# Patient Record
Sex: MALE | Race: WHITE
[De-identification: names, ages, dates, MRNs, and addresses within clinical notes are randomized per-mention and may not be internally consistent; named-entity substitution may affect disease eponyms.]

---

## 2018-01-26 ENCOUNTER — HOSPITAL ENCOUNTER (OUTPATIENT)
Dept: HOSPITAL 68 - ERH | Age: 28
Setting detail: OBSERVATION
LOS: 2 days | End: 2018-01-28
Payer: COMMERCIAL

## 2018-01-26 VITALS — SYSTOLIC BLOOD PRESSURE: 112 MMHG | DIASTOLIC BLOOD PRESSURE: 64 MMHG

## 2018-01-26 VITALS — WEIGHT: 128 LBS | HEIGHT: 65 IN | BODY MASS INDEX: 21.33 KG/M2

## 2018-01-26 DIAGNOSIS — K92.1: Primary | ICD-10-CM

## 2018-01-26 DIAGNOSIS — F79: ICD-10-CM

## 2018-01-26 DIAGNOSIS — Q93.5: ICD-10-CM

## 2018-01-26 DIAGNOSIS — K59.00: ICD-10-CM

## 2018-01-26 LAB
ABSOLUTE GRANULOCYTE CT: 3 /CUMM (ref 1.4–6.5)
APTT BLD: 31 SEC (ref 25–37)
BASOPHILS # BLD: 0 /CUMM (ref 0–0.2)
BASOPHILS NFR BLD: 0.3 % (ref 0–2)
EOSINOPHIL # BLD: 0.1 /CUMM (ref 0–0.7)
EOSINOPHIL NFR BLD: 1.6 % (ref 0–5)
ERYTHROCYTE [DISTWIDTH] IN BLOOD BY AUTOMATED COUNT: 13.6 % (ref 11.5–14.5)
GRANULOCYTES NFR BLD: 52.8 % (ref 42.2–75.2)
HCT VFR BLD CALC: 44.7 % (ref 42–52)
LYMPHOCYTES # BLD: 1.9 /CUMM (ref 1.2–3.4)
MCH RBC QN AUTO: 29.5 PG (ref 27–31)
MCHC RBC AUTO-ENTMCNC: 32.7 G/DL (ref 33–37)
MCV RBC AUTO: 90.2 FL (ref 80–94)
MONOCYTES # BLD: 0.6 /CUMM (ref 0.1–0.6)
PLATELET # BLD: 307 /CUMM (ref 130–400)
PMV BLD AUTO: 8.5 FL (ref 7.4–10.4)
PROTHROMBIN TIME: 11.2 SEC (ref 9.4–12.5)
RED BLOOD CELL CT: 4.95 /CUMM (ref 4.7–6.1)
WBC # BLD AUTO: 5.6 /CUMM (ref 4.8–10.8)

## 2018-01-26 PROCEDURE — G0378 HOSPITAL OBSERVATION PER HR: HCPCS

## 2018-01-26 NOTE — PN- ATT ADDEND
Attending Addendum
Attending Brief Note
27 Year old male lives at a group home, recently in the hospital for "intestinal
obstruction" treatedmedically resolved. A little constipated , had a hard bowel 
movement followed by blood clots, brought to the ER in stable condition stable H
/H. GI was consulted, would like to watch him follow blood work.
 Laboratory Tests
 
 
 
01/26/18 1500:
Anion Gap 14, Estimated GFR > 60, BUN/Creatinine Ratio 16.3, Glucose 88, Calcium
9.8, Total Bilirubin 0.4, AST 25, ALT 69, Alkaline Phosphatase 56, Total Protein
6.8, Albumin 4.3, Globulin 2.5, Albumin/Globulin Ratio 1.7, PT 11.2, INR 1.07, 
APTT 31, CBC w Diff NO MAN DIFF REQ, RBC 4.95, MCV 90.2, MCH 29.5, MCHC 32.7  L,
RDW 13.6, MPV 8.5, Gran % 52.8, Lymphocytes % 34.4, Monocytes % 10.9  H, 
Eosinophils % 1.6, Basophils % 0.3, Absolute Granulocytes 3.0, Absolute 
Lymphocytes 1.9, Absolute Monocytes 0.6, Absolute Eosinophils 0.1, Absolute 
Basophils 0
Vital Signs
 
 
Date Time Temp Pulse Resp B/P B/P Pulse O2 O2 Flow FiO2
 
     Mean Ox Delivery Rate 
 
01/26 1610 97.1 86 20 103/74  97 Room Air  
 
01/26 1607  86  103/74     
 
01/26 1442      96 Room Air  
 
01/26 1434 98.7 86 20 120/57  96 Room Air

## 2018-01-26 NOTE — CONS- GASTROENTEROLOGY
General Information and HPI
 
Consulting Request
Date of Consult: 01/26/18
Requested By:
MD Scott Carlos
 
Reason for Consult:
1.  Hematochezia 
2.  Constipation
Source of Information: Report from Group Home
Exam Limitations: unable to give history
History of Present Illness:
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
in [facility] proximally 2 weeks ago with a small bowel obstruction 
which was treated conservatively.  The patient was brought to the ED from the 
group home where he resides.  They report that he had a diaper full of darkish 
red blood with clots.  Per their report he had had a large hard stool prior to 
this.  He had not complained of any nausea vomiting or abdominal pain.  Patient 
is nonverbal.  History is obtained from HENRY Tamez in the ED as well as from
a representative of the group home.  Patient had a CT scan of the abdomen and 
pelvis which was unrevealing.
 
Allergies/Medications
Allergies:
Coded Allergies:
No Known Allergies (01/26/18)
 
Home Med List:
Acetaminophen 325 MG TABLET   2 TAB PO Q4H PRN PAIN/FEVER>100.2  (Reported)
Bacitracin 500 UNIT/GRAM OINT...G.   1 DANYELL TOP BID PRN ABRASIONS/SCRAPES/MINOR 
BURNS  (Reported)
     apply to affected area(s)
Chlorhexidine Gluconate (Periogard) (Unknown Strength) MOUTHWASH   (Unknown Dose
) PO BID ORAL HEALTH  (Reported)
Cholecalciferol (Vitamin D3) (Vitamin D) 1,000 UNIT TABLET   1 TAB PO DAILY 
SUPPLEMENT  (Reported)
Guaifenesin/Dextromethorphan (Zyncof  MG/5 Ml Liquid) 400 MG-20 MG/5 ML 
LIQUID   5 ML PO Q4H PRN COUGH/CONGESTION  (Reported)
Ketoconazole (Nizoral) 2 % SHAMPOO   1 DANYELL TOP MONDAY WED FRIDAY SCALP  (
Reported)
Magnesium Hydroxide (Milk Of Magnesia) 400 MG/5 ML ORAL.SUSP   15 ML PO QPM PRN 
CONSTIPATION  (Reported)
Mineral Oil/Hydrophil Petrolat (Aquaphor Ointment) 396 GM OINT...G.   1 DANYELL TOP 
AD PRN SKIN  (Reported)
Petrolatum,White (Balmex) 51.1 % OINT...G.   1 DANYELL TOP AD PRN PERINEAL AREA/RED,
CHAFFED SKI  (Reported)
Polyethylene Glycol 3350 (Miralax) 17 GRAM POWD.PACK   1 PAC PO DAILY GI  (
Reported)
     dissolve in water
Pyrithione Zinc (Selsun Blue) (Unknown Strength) SHAMPOO   (Unknown Dose) TOP AD
PRN DANDRUFF  (Reported)
 
Current Medications:
 Current Medications
 
 
  Sig/Cristian Start time  Last
 
Medication Dose Route Stop Time Status Admin
 
Diphenhydramine HCl 50 MG ONCE ONE 01/26 1700 DC 01/26
 
  IV 01/26 1701 1709
 
Diphenhydramine HCl 0 .STK-MED ONE 01/26 1700 DC 
 
  .ROUTE   
 
Lorazepam 1 MG ONCE ONE 01/26 1700 DC 01/26
 
  IV 01/26 1701 1709
 
Lorazepam 0 .STK-MED ONE 01/26 1700 DC 
 
  .ROUTE   
 
 
 
 
Past History
 
Travel History
Traveled to [place] past 21 day No
 
Medical History
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
Gastrointestinal: S/P BOWEL OBSTRUCTION
Musculoskeletal: BILATER LEG BRACES
 
Surgical History
Surgical History: none
 
Psychosocial History
ETOH Use: denies use
Illicit Drug Use: denies illicit drug use
 
Review of Systems
Review of Systems:
Unable to obtain review of systems
 
Exam & Diagnostic Data
Vital Signs and I&O
Vital Signs
 
 
Date Time Temp Pulse Resp B/P B/P Pulse O2 O2 Flow FiO2
 
     Mean Ox Delivery Rate 
 
01/26 1610 97.1 86 20 103/74  97 Room Air  
 
01/26 1607  86  103/74     
 
01/26 1442      96 Room Air  
 
01/26 1434 98.7 86 20 120/57  96 Room Air  
 
 
 Intake & Output
 
 
 01/26 1600 01/26 0400 01/25 1600 01/25 0400 01/24 1600 01/24 0400
 
Intake Total 0     
 
Output Total      
 
Balance 0     
 
       
 
Intake, Oral 0     
 
 
 
 
Physical Exam
General Appearance: no apparent distress, alert, awake
Head: normal appearance
Eyes:
Bilateral: normal appearance. 
Respiratory: chest non-tender, lungs clear
Cardiovascular: regular rate/rhythm, Normal S1 and S2
Gastrointestinal: soft, non-tender, no organomegaly
Neurologic/Psych: Unable to fully examine.  Patient does not follow commands.  
Moves all extremities
Skin: intact, normal color, warm/dry
 
Results
Pertinent Lab Results:
 Laboratory Tests
 
 
 01/26
 
 1500
 
Chemistry 
 
  Sodium (137 - 145 mmol/L) 141
 
  Potassium (3.5 - 5.1 mmol/L) 4.1
 
  Chloride (98 - 107 mmol/L) 97  L
 
  Carbon Dioxide (22 - 30 mmol/L) 30
 
  Anion Gap (5 - 16) 14
 
  BUN (9 - 20 mg/dL) 13
 
  Creatinine (0.7 - 1.2 mg/dL) 0.8
 
  Estimated GFR (>60 ml/min) > 60
 
  BUN/Creatinine Ratio (7 - 25 %) 16.3
 
  Glucose (65 - 99 mg/dL) 88
 
  Calcium (8.4 - 10.2 mg/dL) 9.8
 
  Total Bilirubin (0.2 - 1.3 mg/dL) 0.4
 
  AST (17 - 59 U/L) 25
 
  ALT (21 - 72 U/L) 69
 
  Alkaline Phosphatase (< 127 U/L) 56
 
  Total Protein (6.3 - 8.2 g/dL) 6.8
 
  Albumin (3.5 - 5.0 g/dL) 4.3
 
  Globulin (1.9 - 4.2 gm/dL) 2.5
 
  Albumin/Globulin Ratio (1.1 - 2.2 %) 1.7
 
Coagulation 
 
  PT (9.4 - 12.5 SEC) 11.2
 
  INR (0.90 - 1.17) 1.07
 
  APTT (25 - 37 SEC) 31
 
Hematology 
 
  CBC w Diff NO MAN DIFF REQ
 
  WBC (4.8 - 10.8 /CUMM) 5.6
 
  RBC (4.70 - 6.10 /CUMM) 4.95
 
  Hgb (14.0 - 18.0 G/DL) 14.6
 
  Hct (42 - 52 %) 44.7
 
  MCV (80.0 - 94.0 FL) 90.2
 
  MCH (27.0 - 31.0 PG) 29.5
 
  MCHC (33.0 - 37.0 G/DL) 32.7  L
 
  RDW (11.5 - 14.5 %) 13.6
 
  Plt Count (130 - 400 /CUMM) 307
 
  MPV (7.4 - 10.4 FL) 8.5
 
  Gran % (42.2 - 75.2 %) 52.8
 
  Lymphocytes % (20.5 - 51.1 %) 34.4
 
  Monocytes % (1.7 - 9.3 %) 10.9  H
 
  Eosinophils % (0 - 5 %) 1.6
 
  Basophils % (0.0 - 2.0 %) 0.3
 
  Absolute Granulocytes (1.4 - 6.5 /CUMM) 3.0
 
  Absolute Lymphocytes (1.2 - 3.4 /CUMM) 1.9
 
  Absolute Monocytes (0.10 - 0.60 /CUMM) 0.6
 
  Absolute Eosinophils (0.0 - 0.7 /CUMM) 0.1
 
  Absolute Basophils (0.0 - 0.2 /CUMM) 0
 
 
 
Imaging/Other Studies:
 
FINDINGS:
There is mild dependent bibasilar atelectasis. The visualized lung bases are
otherwise clear. The visualized portions of the heart are unremarkable.
 
The liver is of normal size and attenuation without focal lesions nor
intrahepatic biliary ductal dilation. A normal gallbladder is identified.
There is no wall thickening or discernible pericholecystic fluid.
 
The spleen, pancreas, adrenal glands are unremarkable.
 
Both kidneys are of normal size and attenuation without hydronephrosis or
nephrolithiasis. Following the administration of IV contrast, prompt
symmetric nephrograms are displayed.
 
There is no abdominal free fluid. There is neither mesenteric nor
retroperitoneal lymphadenopathy.
 
Normal unopacified loops of small and large bowel are identified. A normal
appendix is identified. There is no pelvic free fluid. The urinary bladder is
unremarkable. There is neither pelvic nor inguinal lymphadenopathy.
 
Bone windows: Neither sclerotic nor lytic bone lesions are identified.
 
IMPRESSION:
 
No evidence for acute abdominal or pelvic inflammatory or infectious
processes.
 
Assessment/Plan
Assessment/Recommendations:
ASSESSMENT:
1.  Chronic Constipation
2.  Hematochezia
Patient's hematochezia is likely related to either acute nonobstructive colonic 
ischemia in the setting of acute severe constipation or possibly stercoral 
ulceration.  Patient is currently comfortable and not complaining of any 
abdominal pain.  Patient has not had any further hematochezia in the ED.  Per 
the representative from the group home patient has been started on MiraLAX to 
address the issue of chronic constipation
 
RECOMMENDATIONS:
1.  Patient admitted for observation
2.  Check H&H in a.m.
3.  Observe for clinical signs of gastrointestinal bleeding
4.  Patient have colonoscopy as an outpatient.
5.  Patient to follow-up with Dr. Diaz for outpatient colonoscopy.  This can
be scheduled as an open access colonoscopy
 
 
Consult Acknowledgment
- Thank you for your consult request.

## 2018-01-26 NOTE — HISTORY & PHYSICAL
Nelson HAMPTON,[facility] 01/26/18 1825:
General Information and HPI
MD Statement:
I have seen and personally examined CHILO ACUÑA and documented this H&P.
 
The patient is a 27 year old M who presented with a patient stated chief 
complaint of [GI bleed].
 
Source of Information: care taker
Exam Limitations: not alert/orientated, clinical condition
History of Present Illness:
27-year-old male with a past medical history of Angelman syndrome and bowel 
obstruction and not verbal at baseline who presented with GI bleed.  The patient
's caretaker states that the patient had a large stool this morning.  The 
patient then went to the bathroom again this afternoon.  The previous caretaker 
stated that there was a lot of blood clots mixed with stool in addition to 
bright red blood.  Prior to having it bowel movement this morning, the patient's
last was 2 days prior.  The caretaker states that the patient never had episodes
like this before.  The caretaker states that the patient has not had a nausea 
vomiting.
 
Allergies/Medications
Allergies:
Coded Allergies:
No Known Allergies (01/26/18)
 
Home Med list
Acetaminophen 325 MG TABLET   2 TAB PO Q4H PRN PAIN/FEVER>100.2  (Reported)
Bacitracin 500 UNIT/GRAM OINT...G.   1 DANYELL TOP BID PRN ABRASIONS/SCRAPES/MINOR 
BURNS  (Reported)
     apply to affected area(s)
Chlorhexidine Gluconate (Periogard) (Unknown Strength) MOUTHWASH   (Unknown Dose
) PO BID ORAL HEALTH  (Reported)
Cholecalciferol (Vitamin D3) (Vitamin D) 1,000 UNIT TABLET   1 TAB PO DAILY 
SUPPLEMENT  (Reported)
Guaifenesin/Dextromethorphan (Zyncof  MG/5 Ml Liquid) 400 MG-20 MG/5 ML 
LIQUID   5 ML PO Q4H PRN COUGH/CONGESTION  (Reported)
Ketoconazole (Nizoral) 2 % SHAMPOO   1 DANYELL TOP MONDAY WED FRIDAY SCALP  (
Reported)
Magnesium Hydroxide (Milk Of Magnesia) 400 MG/5 ML ORAL.SUSP   15 ML PO QPM PRN 
CONSTIPATION  (Reported)
Mineral Oil/Hydrophil Petrolat (Aquaphor Ointment) 396 GM OINT...G.   1 DANYELL TOP 
AD PRN SKIN  (Reported)
Petrolatum,White (Balmex) 51.1 % OINT...G.   1 DANYELL TOP AD PRN PERINEAL AREA/RED,
CHAFFED SKI  (Reported)
Polyethylene Glycol 3350 (Miralax) 17 GRAM POWD.PACK   1 PAC PO DAILY GI  (
Reported)
     dissolve in water
Pyrithione Zinc (Selsun Blue) (Unknown Strength) SHAMPOO   (Unknown Dose) TOP AD
PRN DANDRUFF  (Reported)
 
 
Past History
 
Travel History
Traveled to [place] past 21 day No
 
Medical History
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
Gastrointestinal: S/P BOWEL OBSTRUCTION
Musculoskeletal: BILATER LEG BRACES
 
Past Family/Social History
 
Psychosocial History
ETOH Use: denies use
Illicit Drug Use: denies illicit drug use
 
Review of Systems
 
Review of Systems
Constitutional:
Reports: no symptoms. 
Cardiovascular:
Reports: no symptoms. 
Respiratory:
Reports: no symptoms. 
GI:
Reports: bloody stool. 
Genitourinary:
Reports: no symptoms. 
Musculoskeletal:
Reports: no symptoms. 
Skin:
Reports: no symptoms. 
 
Exam & Diagnostic Data
Last 24 Hrs of Vital Signs/I&O
 Vital Signs
 
 
Date Time Temp Pulse Resp B/P B/P Pulse O2 O2 Flow FiO2
 
     Mean Ox Delivery Rate 
 
01/26 2300  58 18 112/64  100 Room Air  
 
01/26 1904 97.6 75 18 103/75     
 
01/26 1610 97.1 86 20 103/74  97 Room Air  
 
01/26 1607  86  103/74     
 
01/26 1442      96 Room Air  
 
01/26 1434 98.7 86 20 120/57  96 Room Air  
 
 
 Intake & Output
 
 
 01/27 0800 01/27 0000 01/26 1600
 
Intake Total  120 0
 
Output Total   
 
Balance  120 0
 
    
 
Intake, Oral  120 0
 
Patient  128 lb 
 
Weight   
 
Weight   
 
Measurement   
 
Method   
 
 
 
 
Physical Exam
General Appearance the patient's eyes are closed, and the patient does not 
communicate with staff
Skin finger capillary refill less than 2 seconds
Cardiovascular Regular Rate, Normal S1, Normal S2
Lungs Clear to Auscultation, Normal Air Movement
Abdomen Normal Bowel Sounds, Soft, No Tenderness
Vascular 2+ radial pulses
Last 24 Hrs of Labs/Albaro:
 Laboratory Tests
 
01/26/18 1500:
Anion Gap 14, Estimated GFR > 60, BUN/Creatinine Ratio 16.3, Glucose 88, Calcium
9.8, Total Bilirubin 0.4, AST 25, ALT 69, Alkaline Phosphatase 56, Total Protein
6.8, Albumin 4.3, Globulin 2.5, Albumin/Globulin Ratio 1.7, PT 11.2, INR 1.07, 
APTT 31, CBC w Diff NO MAN DIFF REQ, RBC 4.95, MCV 90.2, MCH 29.5, MCHC 32.7  L,
RDW 13.6, MPV 8.5, Gran % 52.8, Lymphocytes % 34.4, Monocytes % 10.9  H, 
Eosinophils % 1.6, Basophils % 0.3, Absolute Granulocytes 3.0, Absolute 
Lymphocytes 1.9, Absolute Monocytes 0.6, Absolute Eosinophils 0.1, Absolute 
Basophils 0
 
 
Assessment/Plan
Assessment:
A: 27-year-old male with a past medical history of Angelman syndrome and bowel 
obstruction and not verbal at baseline who presented with GI bleed.
 
P: 
#GI bleed
H/H 14.6/44.7
INR 1.07, PTT 31, PT 11.2
 
-Monitor vitals closely every shift, Type and screen, 2 large IV bore needles 
gentle hydration
-monitoring of hemoglobin and hematocrit
-IV Protonix
-reccheck orthostats
-Guaiac stools
-Patient was seen by Dr. Diaz who recommended that the patient have follow-
up for outpatient colonoscopy
 
#Chronic constipation
-Vitamin D, MiraLAX, milk of magnesia
 
#Angelman syndrome
-Continuous observation monitor
 
#full code
 
DVT prophylaxis subcutaneous Lovenox
 
As Ranked By This Provider
Problem List:
 1. GI bleed
   Qualifiers
 GI bleed type/associated pathology: anorectal hemorrhage Qualified Code: K62.5 
- Hemorrhage of anus and rectum
 
 
Core Measures/Misc (9/17)
 
Acute Coronary Syndrome
ACS Diagnosis: No
 
Congestive Heart Failure
Congestive Heart Failure Diagnosis No
 
Cerebrovascular Accident
CVA/TIA Diagnosis: No
 
VTE (View Protocol)
VTE Risk Factors Acute Medical Illness
No Mechanical VTE Prophylaxis d/t Other
No VTE Pharm Prophylaxis d/t NA PharmProphylax ordered
 
Sepsis (View protocol)
Sepsis Present: No
 
Aleksander House 01/27/18 0045:
Past History
 
Surgical History
Surgical History: unobtainable
 
 
Resident Review Statement
Resident Statement: examined this patient, discussed with intern, agreed with 
intern, discussed with family, reviewed EMR data (avail), discussed with nursing
, discussed with case mgmt, reviewed images, amended to note
Other Findings:
This is a 27-year-old male with past medical history significant for mental 
retardation, Angelman syndrome, bowel obstruction, constipation presented to the
[place] from his group home for evaluation of bright red blood per rectum.
 
She was admitted to [facility] 2 weeks ago for nausea, vomiting, found 
to have small bowel obstruction, hospitalized for 5 days, managed 
conservatively.
 
The patient was brought to the ED from the group home where he resides.  They 
report that he had a diaper full of darkish red blood with clots.  Per their 
report he had a large hard stool prior to this.  He had not complained of any 
nausea vomiting or abdominal pain.  Patient is nonverbal.  Review of systems 
unobtainable.
 
 
--------------------------------------------------------------------------------
-----
Vitals at the time of presentation afebrile, heart rate 80, respiratory rate 20,
blood pressure 120/57, saturating at 96 on room air.
Physical exam [id number] normal, breath sounds good, abdomen nondistended 
soft, nontender.
Labs at the time of admission 
hemoglobin 14, hematocrit 44, CBC, BMP completely normal.  LFTs normal.
CAT scan of abdomen and pelvis was done which was unremarkable
 
Assessment and plan
1. Hematochezia
2. Chronic constipation
3: Mental retardation, nonverbal at baseline
 
 
1.  Hematochezia
Patient presented with an episode of bright red blood per rectum with clots.  He
has been constipated for last few days.  Bright red blood was seen after having 
large bowel movements.  He is hemodynamically stable blood pressure within 
normal limits.  Not tachycardic.  Hemoglobin 14 and hematocrit 44.7.  Lower GI 
bleed hematochezia most likely from acute severe constipation/sterc oral 
ulceration.  Patient denies any further episodes of hematochezia in the 
emergency room.
 
* Place under observation status in the general medicine floor
* Monitor vitals closely every shift
* Type and screen
* 2 large IV bore needles gentle hydration
* Serial monitoring of hemoglobin and hematocrit
* Closely monitor for any signs of GI bleed
* IV Protonix
* Management of constipation with bowel regimen
* Appreciate Gastro recommendation
* We'll check orthostats
* Guaiac stools
* Patient will need outpatient colonoscopy.
 
 
Chronic constipation
Bowel regimen was added
 
Mental retardation
Continuous observation monitor
 
Patient is full code
DVT prophylaxis subcutaneous Lovenox
Regular diet

## 2018-01-26 NOTE — ED GI/GU/ABDOMINAL COMPLAINT
History of Present Illness
 
General
Chief Complaint: General Adult
Stated Complaint: BIBA FOR RECTAL BLEED
Source: [facility]
Exam Limitations: clinical condition
 
Vital Signs & Intake/Output
Vital Signs & Intake/Output
 Vital Signs
 
 
Date Time Temp Pulse Resp B/P B/P Pulse O2 O2 Flow FiO2
 
     Mean Ox Delivery Rate 
 
 1610 97.1 86 20 103/74  97 Room Air  
 
 1607  86  103/74     
 
 1442      96 Room Air  
 
 1434 98.7 86 20 120/57  96 Room Air  
 
 
 
Allergies
Coded Allergies:
No Known Allergies (18)
 
Triage Note:
28 Y/O MALE TO ED ROOM 17 VIA EMS STRETCHER FROM
ADULT  WITH C/O LOWER GI BLEED. DIAPER AT
 HAD STOOL WITH BLOOD AND CLOTS.
Triage Nurses Notes Reviewed? yes
Onset: Abrupt
Duration: day(s): (2), changing over time, continues in ED
Timing: single episode today
Activities at Onset: BOWEL MOVEMENT
Prior Abdominal Problems: none
Past Sexual History: Unobtainable at this time
No Modifying Factors: none
Associated Symptoms: HEMATOCHEZIA
HPI:
27-year-old male past medical history of Angelman syndrome since her evaluation 
of rectal bleeding.  Patient's caregiver reports that patient was hospitalized 
at [facility] 2 weeks ago for a small bowel obstruction.  He was 
discharged after several days without a surgical procedure.  The small bowel 
obstruction resolved on its own.  Patient had been constipated for several days 
after this.  He was treated with milk of magnesia yesterday he had a large hard 
bowel movement without any blood.  Today he had a bowel movement that was 
described by his caregiver as "appeared like a woman's menstrual cycle".  There 
was blood and dark red blood clots.  Patient does not take blood thinners.  His 
mental status is at baseline.  He's been eating and drinking normally.  No 
abdominal pain nausea or vomiting.  He has not had a colonoscopy in the past.  
There is been no additional bleeding other than the single episode.
(Scottie Farrell)
Reconcile Medications
Acetaminophen 325 MG TABLET   2 TAB PO Q4H PRN PAIN/FEVER>100.2  (Reported)
Bacitracin 500 UNIT/GRAM OINT...G.   1 DANYELL TOP BID PRN ABRASIONS/SCRAPES/MINOR 
BURNS  (Reported)
     apply to affected area(s)
Chlorhexidine Gluconate (Periogard) (Unknown Strength) MOUTHWASH   (Unknown Dose
) PO BID ORAL HEALTH  (Reported)
Cholecalciferol (Vitamin D3) (Vitamin D) 1,000 UNIT TABLET   1 TAB PO DAILY 
SUPPLEMENT  (Reported)
Guaifenesin/Dextromethorphan (Zyncof  MG/5 Ml Liquid) 400 MG-20 MG/5 ML 
LIQUID   5 ML PO Q4H PRN COUGH/CONGESTION  (Reported)
Ketoconazole (Nizoral) 2 % SHAMPOO   1 DANYELL TOP  SCALP  (
Reported)
Magnesium Hydroxide (Milk Of Magnesia) 400 MG/5 ML ORAL.SUSP   15 ML PO QPM PRN 
CONSTIPATION  (Reported)
Mineral Oil/Hydrophil Petrolat (Aquaphor Ointment) 396 GM OINT...G.   1 DANYELL TOP 
AD PRN SKIN  (Reported)
Omeprazole 20 MG TABLET.DR   1 TAB PO DAILY gi bleed
Petrolatum,White (Balmex) 51.1 % OINT...G.   1 DANYELL TOP AD PRN PERINEAL AREA/RED,
CHAFFED SKI  (Reported)
Polyethylene Glycol 3350 (Miralax) 17 GRAM POWD.PACK   1 PAC PO DAILY GI  (
Reported)
     dissolve in water
Pyrithione Zinc (Selsun Blue) (Unknown Strength) SHAMPOO   (Unknown Dose) TOP AD
PRN DANDRUFF  (Reported)
 
(Rosa HAMPTON,Drew MILLER)
 
Past History
 
Travel History
Traveled to [place] past 21 day No
 
Medical History
Any Pertinent Medical History? see below for history
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
Gastrointestinal: S/P BOWEL OBSTRUCTION
Musculoskeletal: BILATER LEG BRACES
 
Surgical History
Surgical History: none
 
Psychosocial History
Tobacco Use: Never used
ETOH Use: denies use
Illicit Drug Use: denies illicit drug use
 
Family History
Hx Contributory? No
(Scottie Farrell)
 
Review of Systems
 
Review of Systems
Constitutional:
Reports: no symptoms. 
EENTM:
Reports: no symptoms. 
Respiratory:
Reports: no symptoms. 
Cardiovascular:
Reports: no symptoms. 
GI:
Reports: bloody stool. 
Genitourinary:
Reports: no symptoms. 
Musculoskeletal:
Reports: no symptoms. 
Skin:
Reports: no symptoms. 
Neurological/Psychological:
Reports: no symptoms. 
Hematologic/Endocrine:
Reports: no symptoms. 
Immunologic/Allergic:
Reports: no symptoms. 
All Other Systems: Reviewed and Negative
(Scottie Farrell)
 
Physical Exam
 
Physical Exam
General Appearance: well developed/nourished, no apparent distress, alert, awake
, comfortable
Head: atraumatic, normal appearance
Eyes:
Bilateral: normal appearance, PERRL, EOMI. 
Ears, Nose, Throat, Mouth: hearing grossly normal, moist mucous membrane, 
Tympanic normal
Neck: normal inspection, supple, full range of motion
Respiratory: normal breath sounds, chest non-tender, no respiratory distress, 
lungs clear
Cardiovascular: regular rate/rhythm, normal peripheral pulses
Peripheral Pulses:
2+ radial (R), 2+ radial (L)
Gastrointestinal: normal bowel sounds, soft, non-tender, no organomegaly
Rectal: normal inspection, normal rectal tone, heme positive stool
Back: normal inspection, normal range of motion
Extremities: normal range of motion
Neurologic/Psych: no motor/sensory deficits, awake, alert
Skin: intact, normal color, warm/dry
 
Core Measures
ACS in differential dx? No
Sepsis Present: No
Sepsis Focused Exam Completed? No
(Scottie Farrell)
 
Progress
Differential Diagnosis: appendicitis, biliary colic, bowel obstruction, 
diverticulitis, gastritis, hemorrhoids, Naomy-Blessing tear, peptic ulcer, PUD/
GERD, SBO, avm, ISCHEMIC COLITIS
Plan of Care:
 Orders
 
 
Procedure Date/time Status
 
Patient Data  1821 Active
 
Place in observation  1625 Active
 
MISTAKE  1442 Active
 
PARTIAL THROMBOPLASTIN TIME  1442 Complete
 
PROTHROMBIN TIME  1442 Complete
 
COMPREHENSIVE METABOLIC PANEL  1442 Complete
 
CBC WITHOUT DIFFERENTIAL  1442 Complete
 
Intake & Output  1433 Active
 
 
 Laboratory Tests
 
 
 
18 1500:
Anion Gap 14, Estimated GFR > 60, BUN/Creatinine Ratio 16.3, Glucose 88, Calcium
9.8, Total Bilirubin 0.4, AST 25, ALT 69, Alkaline Phosphatase 56, Total Protein
6.8, Albumin 4.3, Globulin 2.5, Albumin/Globulin Ratio 1.7, PT 11.2, INR 1.07, 
APTT 31, CBC w Diff NO MAN DIFF REQ, RBC 4.95, MCV 90.2, MCH 29.5, MCHC 32.7  L,
RDW 13.6, MPV 8.5, Gran % 52.8, Lymphocytes % 34.4, Monocytes % 10.9  H, 
Eosinophils % 1.6, Basophils % 0.3, Absolute Granulocytes 3.0, Absolute 
Lymphocytes 1.9, Absolute Monocytes 0.6, Absolute Eosinophils 0.1, Absolute 
Basophils 0
 
 
Patient seen and evaluated.  He has what was described as large amount of dark 
blood and blood clots in his stool.  This is concerning for a larger GI bleed.  
He also had a possible small bowel obstruction 2 weeks ago and had a large hard 
bowel movement that proceeded the blood.  Patient's mental status is at baseline
he is hemodynamically stable.  Rectal exam was positive for occult blood.  He is
clinically well appearing.  Bloodwork is within normal limits.  CT scan does not
show any evidence of colitis.  Spoke with Dr. Le from gastroenterology.  
She recommends admitting the patient for observation due to the concern for 
significant GI bleeding.  Patient will be kept for serial H&H's, colonoscopy, 
monitoring of vital signs and GI consult, case discussed with Dr. Lee he 
agrees.
Diagnostic Imaging:
Viewed by Me: CT Scan.  Discussed w/RAD: CT Scan. 
Radiology Impression: PATIENT: CHILO ACUÑA  MEDICAL RECORD NO: [id number] PRESENT 
AGE: 27  PATIENT ACCOUNT NO: [id number] : 91  LOCATION: [facility] ORDERING 
PHYSICIAN: Scottie FIGUEROA     SERVICE DATE:  EXAM TYPE: CAT - CT ABD
& PELVIS W IV CONTRAST EXAMINATION: CT ABDOMEN AND PELVIS WITH CONTRAST CLINICAL
INFORMATION: Abdominal pain. COMPARISON: None. TECHNIQUE: Contiguous axial thin 
section helical images of the abdomen and pelvis were performed following the 
administration of 95 mL of intravenous Optiray 320. The data set was reformatted
in the coronal and sagittal planes and reviewed on an independent workstation. 
DLP: 348 mGy-cm. FINDINGS: There is mild dependent bibasilar atelectasis. The 
visualized lung bases are otherwise clear. The visualized portions of the heart 
are unremarkable. The liver is of normal size and attenuation without focal 
lesions nor intrahepatic biliary ductal dilation. A normal gallbladder is 
identified. There is no wall thickening or discernible pericholecystic fluid. 
The spleen, pancreas, adrenal glands are unremarkable. Both kidneys are of 
normal size and attenuation without hydronephrosis or nephrolithiasis. Following
the administration of IV contrast, prompt symmetric nephrograms are displayed. 
There is no abdominal free fluid. There is neither mesenteric nor 
retroperitoneal lymphadenopathy. Normal unopacified loops of small and large 
bowel are identified. A normal appendix is identified. There is no pelvic free 
fluid. The urinary bladder is unremarkable. There is neither pelvic nor inguinal
lymphadenopathy. Bone windows: Neither sclerotic nor lytic bone lesions are 
identified. IMPRESSION: No evidence for acute abdominal or pelvic inflammatory 
or infectious processes. DICTATED BY: Glenn Peres MD  DATE/TIME DICTATED: :RAD.[email or web address]  DATE/TIME TRANSCRIBED:18 
CONFIDENTIAL, DO NOT COPY WITHOUT APPROPRIATE AUTHORIZATION.
Initial ED EKG: none
(Scottie Farrell)
 
Departure
 
Departure
Disposition: STILL A PATIENT
Condition: Stable
Clinical Impression
Primary Impression: GI bleed
Qualifiers:  GI bleed type/associated pathology: anorectal hemorrhage Qualified 
Code: K62.5 - Hemorrhage of anus and rectum
Referrals:
Ferdinand Scott MD (PCP/Family)
 
Departure Forms:
Customer Survey
General Discharge Information
 
Observation Note
Spoke With:
Ferdinand Scott MD
Physician Advisor Notified: SHEKHAR HAMPTON,HOLDEN JOSE
Place Patient In: Non-ED OBS Care Area
Rationale for Observation:
My rational for observation is as follows [patient had a large amount of 
bleeding from his rectum that is described as "a woman's menstrual cycle" by his
caretakers.  There were large numbers of clots there.  This is concerning for a 
significant GI bleed.  Dr. Le from gastroenterology recommends admitting 
for observation for a GI consult, colonoscopy, serial labs, monitoring of vital 
signs].
 
(Scottie Farrell)
 
Departure
Prescriptions:
Current Visit Scripts
Omeprazole 1 TAB PO DAILY  
     #30 TAB 
 
 
 
PA/NP Co-Sign Statement
Statement:
ED Attending supervision documentation-
 
[X] I saw and evaluated the patient. I have also reviewed all the pertinent lab 
results and diagnostic results. I agree with the findings and the plan of care 
as documented in the PA's/NP's documentation.  Patient presents for possibility 
of a GI bleed.  Physical examination reveals a comfortable appearing patient 
with nondistended nontender abdomen.
 
[] I have reviewed the ED Record and agree with the PA's/NP's documentation.
 
[] Additions or exceptions (if any) to the PAs/NP's note and plan are 
summarized below:
[]
 
(Rosa HAMPTON,Drew MILLER)

## 2018-01-26 NOTE — CT SCAN REPORT
EXAMINATION:
CT ABDOMEN AND PELVIS WITH CONTRAST
 
CLINICAL INFORMATION:
Abdominal pain.
 
COMPARISON:
None.
 
TECHNIQUE:
Contiguous axial thin section helical images of the abdomen and pelvis were
performed following the administration of 95 mL of intravenous Optiray 320.
The data set was reformatted in the coronal and sagittal planes and reviewed
on an independent workstation.
 
DLP: 348 mGy-cm.
 
FINDINGS:
There is mild dependent bibasilar atelectasis. The visualized lung bases are
otherwise clear. The visualized portions of the heart are unremarkable.
 
The liver is of normal size and attenuation without focal lesions nor
intrahepatic biliary ductal dilation. A normal gallbladder is identified.
There is no wall thickening or discernible pericholecystic fluid.
 
The spleen, pancreas, adrenal glands are unremarkable.
 
Both kidneys are of normal size and attenuation without hydronephrosis or
nephrolithiasis. Following the administration of IV contrast, prompt
symmetric nephrograms are displayed.
 
There is no abdominal free fluid. There is neither mesenteric nor
retroperitoneal lymphadenopathy.
 
Normal unopacified loops of small and large bowel are identified. A normal
appendix is identified. There is no pelvic free fluid. The urinary bladder is
unremarkable. There is neither pelvic nor inguinal lymphadenopathy.
 
Bone windows: Neither sclerotic nor lytic bone lesions are identified.
 
IMPRESSION:
 
No evidence for acute abdominal or pelvic inflammatory or infectious
processes.

## 2018-01-27 VITALS — DIASTOLIC BLOOD PRESSURE: 60 MMHG | SYSTOLIC BLOOD PRESSURE: 100 MMHG

## 2018-01-27 VITALS — DIASTOLIC BLOOD PRESSURE: 70 MMHG | SYSTOLIC BLOOD PRESSURE: 120 MMHG

## 2018-01-27 VITALS — DIASTOLIC BLOOD PRESSURE: 70 MMHG | SYSTOLIC BLOOD PRESSURE: 134 MMHG

## 2018-01-27 LAB
ABSOLUTE GRANULOCYTE CT: 3 /CUMM (ref 1.4–6.5)
BASOPHILS # BLD: 0 /CUMM (ref 0–0.2)
BASOPHILS NFR BLD: 0.6 % (ref 0–2)
EOSINOPHIL # BLD: 0.1 /CUMM (ref 0–0.7)
EOSINOPHIL NFR BLD: 1.6 % (ref 0–5)
ERYTHROCYTE [DISTWIDTH] IN BLOOD BY AUTOMATED COUNT: 13.9 % (ref 11.5–14.5)
GRANULOCYTES NFR BLD: 56.8 % (ref 42.2–75.2)
HCT VFR BLD CALC: 43.2 % (ref 42–52)
LYMPHOCYTES # BLD: 1.7 /CUMM (ref 1.2–3.4)
MCH RBC QN AUTO: 30.1 PG (ref 27–31)
MCHC RBC AUTO-ENTMCNC: 33.2 G/DL (ref 33–37)
MCV RBC AUTO: 90.5 FL (ref 80–94)
MONOCYTES # BLD: 0.5 /CUMM (ref 0.1–0.6)
PLATELET # BLD: 264 /CUMM (ref 130–400)
PMV BLD AUTO: 9.2 FL (ref 7.4–10.4)
RED BLOOD CELL CT: 4.77 /CUMM (ref 4.7–6.1)
WBC # BLD AUTO: 5.4 /CUMM (ref 4.8–10.8)

## 2018-01-27 NOTE — PATIENT DISCHARGE INSTRUCTIONS
Discharge Instructions
 
General Discharge Information
You were seen/treated for:
Bleeding per rectum
Constipation
Watch for these problems:
Bleeding per rectum, constiaption, abdominal pain and fever.
If you experinence any of these sumptoms come to ED or call to your pcp.
 
Special Instructions:
Follow up with pcp in one week.
Follow up with Dr. Diaz in one week and discuss for colonoscopy in future.
 
Diet
Recommended Diet: Regular
 
Activity
Activity Self Limited: Yes
 
Acute Coronary Syndrome
 
Inclusion Criteria
At DC or during hospital stay patient has or had the following:
ACS DIAGNOSIS No
 
Discharge Core Measures
Meds if any: Prescribed or Continued at Discharge
Meds if any: NOT Prescribed or Continued at Discharge
 
Congestive Heart Failure
 
Inclusion Criteria
At DC or during hospital stay patient has or had the following:
CHF DIAGNOSIS No
 
Discharge Core Measures
Meds if any: Prescribed or Continued at Discharge
Meds if any: NOT Prescribed or Continued at Discharge
 
Cerebrovascular accident
 
Inclusion Criteria
At DC or during hospital stay patient has or had the following:
CVA/TIA Diagnosis No
 
Discharge Core Measures
Meds if any: Prescribed or Continued at Discharge
Meds if any: NOT Prescribed or Continued at Discharge
 
Venous thromboembolism
 
Inclusion Criteria
VTE Diagnosis No
VTE Type NONE
VTE Confirmed by (Test) NONE
 
Discharge Core Measures
- Per Current guidelines, there needs to be overlap
- treatment for the first 5 days of Warfarin therapy.
- If discharged on Warfarin prior to 5 days of
- overlap therapy, the patient will need to be
- assessed for post discharge needs including
- *Post discharge parental anticoagulation
- *Warfarin and/or parental anticoagulation education
- *Follow up date to check INR post discharge
At least 5 days overlap therapy as Inpatient No
Meds if any: Prescribed or Continued at Discharge
Note: Overlap Therapy is Warfarin and Anticoagulant
Meds if any: NOT Prescribed or Continued at Discharge

## 2018-01-27 NOTE — PN- GASTROENTEROLOGY
Assessment/Plan
Assessment/Recommendations:
ASSESSMENT:
1.  Chronic Constipation
2.  Hematochezia
 
Patient's hematochezia is likely related to either acute nonobstructive colonic 
ischemia in the setting of acute severe constipation or possibly stercoral 
ulceration.  Patient is currently comfortable and not complaining of any 
abdominal pain.  Patient has not had any further hematochezia in the ED.  Per 
the representative from the group home patient has been started on MiraLAX to 
address the issue of chronic constipation
 
RECOMMENDATIONS:
1.  Patient tolerating diet
2.  Stable for discharge today.
3.  Per housestaff, has communicated with family and with group home, patient 
will need followup with PAUL Diaz for outpatient colonoscopy.  
4.  GI will sign off for now.
 
 
Subjective
Subjective:
Patient non-verbal.  Up in bed, playing with toys.  Appears comfortable.  Per 
nursing no further bleeding.
 
Objective
Vital Signs and I&Os
Vital Signs
 
 
Date Time Temp Pulse Resp B/P B/P Pulse O2 O2 Flow FiO2
 
     Mean Ox Delivery Rate 
 
01/27 1440 98.0 70 18 120/70  98 Room Air  
 
01/27 0701 97.4 98 18 134/70  98 Room Air  
 
01/26 2300  58 18 112/64  100 Room Air  
 
01/26 1904 97.6 75 18 103/75     
 
 
 Intake & Output
 
 
 01/27 1600 01/27 0400 01/26 1600 01/26 0400 01/25 1600 01/25 0400
 
Intake Total 1220 120 0   
 
Output Total      
 
Balance 1220 120 0   
 
       
 
Intake,      
 
Intake, Oral 620 120 0   
 
Number 0     
 
Bowel      
 
Movements      
 
Patient  128 lb    
 
Weight      
 
Weight      
 
Measurement      
 
Method      
 
 
 
 
Physical Exam
General Appearance: alert, awake, comfortable
Respiratory: lungs clear
Cardiovascular: regular rate/rhythm
Abdomen: normal bowel sounds, soft, non-tender, no organomegaly
Neurologic/Psychiatric: Non-verbal, Angelman's Syndrome
Current Medications:
 Current Medications
 
 
  Sig/Cristian Start time  Last
 
Medication Dose Route Stop Time Status Admin
 
Acetaminophen 650 MG Q6P PRN 01/26 2000 AC 
 
  PO   
 
Cholecalciferol 1,000 IU DAILY 01/27 1000 AC 
 
  PO   
 
Diphenhydramine HCl 50 MG ONCE ONE 01/26 1700 DC 01/26
 
  IV 01/26 1701  1709
 
Diphenhydramine HCl 0 .STK-MED ONE 01/26 1700 DC 
 
  .ROUTE   
 
Enoxaparin Sodium 40 MG DAILY 01/27 1000 AC 01/27
 
  SC   1118
 
Guaifenesin/Codeine  10 ML Q6P PRN 01/26 2000 AC 
 
Phosphate  PO   
 
Lorazepam 1 MG ONCE ONE 01/26 1700 DC 01/26
 
  IV 01/26 1701  1709
 
Lorazepam 0 .STK-MED ONE 01/26 1700 DC 
 
  .ROUTE   
 
Magnesium Hydroxide 15 ML QPM PRN 01/26 2000 AC 
 
  PO   
 
Pantoprazole Sodium 40 MG DAILY 01/26 1957 AC 01/27
 
  IV   1118
 
Polyethylene Glycol 17 GM DAILY PRN 01/26 2000 AC 
 
  PO   
 
Sodium Chloride 1,000 ML ONCE ONE 01/26 2000 DC 01/26
 
  IV 01/27 0919  2132
 
 
 
 
Results
Pertinent Lab Results:
 Laboratory Tests
 
 
 01/27 01/26
 
 0745 1500
 
Chemistry  
 
  Sodium (137 - 145 mmol/L) 141 141
 
  Potassium (3.5 - 5.1 mmol/L) 4.4 4.1
 
  Chloride (98 - 107 mmol/L) 102 97  L
 
  Carbon Dioxide (22 - 30 mmol/L) 24 30
 
  Anion Gap (5 - 16) 16 14
 
  BUN (9 - 20 mg/dL) 11 13
 
  Creatinine (0.7 - 1.2 mg/dL) 0.8 0.8
 
  Estimated GFR (>60 ml/min) > 60 > 60
 
  BUN/Creatinine Ratio (7 - 25 %) 13.8 16.3
 
  Glucose (65 - 99 mg/dL)  88
 
  Calcium (8.4 - 10.2 mg/dL)  9.8
 
  Total Bilirubin (0.2 - 1.3 mg/dL)  0.4
 
  AST (17 - 59 U/L)  25
 
  ALT (21 - 72 U/L)  69
 
  Alkaline Phosphatase (< 127 U/L)  56
 
  Total Protein (6.3 - 8.2 g/dL)  6.8
 
  Albumin (3.5 - 5.0 g/dL)  4.3
 
  Globulin (1.9 - 4.2 gm/dL)  2.5
 
  Albumin/Globulin Ratio (1.1 - 2.2 %)  1.7
 
Coagulation  
 
  PT (9.4 - 12.5 SEC)  11.2
 
  INR (0.90 - 1.17)  1.07
 
  APTT (25 - 37 SEC)  31
 
Hematology  
 
  CBC w Diff NO MAN DIFF REQ NO MAN DIFF REQ
 
  WBC (4.8 - 10.8 /CUMM) 5.4 5.6
 
  RBC (4.70 - 6.10 /CUMM) 4.77 4.95
 
  Hgb (14.0 - 18.0 G/DL) 14.4 14.6
 
  Hct (42 - 52 %) 43.2 44.7
 
  MCV (80.0 - 94.0 FL) 90.5 90.2
 
  MCH (27.0 - 31.0 PG) 30.1 29.5
 
  MCHC (33.0 - 37.0 G/DL) 33.2 32.7  L
 
  RDW (11.5 - 14.5 %) 13.9 13.6
 
  Plt Count (130 - 400 /CUMM) 264 307
 
  MPV (7.4 - 10.4 FL) 9.2 8.5
 
  Gran % (42.2 - 75.2 %) 56.8 52.8
 
  Lymphocytes % (20.5 - 51.1 %) 31.4 34.4
 
  Monocytes % (1.7 - 9.3 %) 9.6  H 10.9  H
 
  Eosinophils % (0 - 5 %) 1.6 1.6
 
  Basophils % (0.0 - 2.0 %) 0.6 0.3
 
  Absolute Granulocytes (1.4 - 6.5 /CUMM) 3.0 3.0
 
  Absolute Lymphocytes (1.2 - 3.4 /CUMM) 1.7 1.9
 
  Absolute Monocytes (0.10 - 0.60 /CUMM) 0.5 0.6
 
  Absolute Eosinophils (0.0 - 0.7 /CUMM) 0.1 0.1
 
  Absolute Basophils (0.0 - 0.2 /CUMM) 0 0

## 2018-01-27 NOTE — PN- HOUSESTAFF
Nelson HAMPTON,[facility] 18 0758:
Subjective
Follow-up For:
BI bleed
Subjective:
No acute events overnight. Patient hunched over in bed. Of note, patient is non 
verbal
 
Review of Systems
Constitutional:
Reports: no symptoms. 
Comments:
Patient is non verbal
 
Objective
Last 24 Hrs of Vital Signs/I&O
 Vital Signs
 
 
Date Time Temp Pulse Resp B/P B/P Pulse O2 O2 Flow FiO2
 
     Mean Ox Delivery Rate 
 
 2132 98.3 64 18 100/60  100 Room Air  
 
 1440 98.0 70 18 120/70  98 Room Air  
 
 0701 97.4 98 18 134/70  98 Room Air  
 
 
 Intake & Output
 
 
  1600  0800  0000
 
Intake Total 620 600 120
 
Output Total   
 
Balance 620 600 120
 
    
 
Intake, IV  600 
 
Intake, Oral 620  120
 
Number 0  
 
Bowel   
 
Movements   
 
Patient   128 lb
 
Weight   
 
Weight   
 
Measurement   
 
Method   
 
 
 
 
Physical Exam
General Appearance: Alert, Cooperative, No Acute Distress
Cardiovascular: Regular Rate, Normal S1, Normal S2
Lungs: Clear to Auscultation, Normal Air Movement, unable to fully appreciate 
full breath sounds. patient is non-cooperative with exam.
Abdomen: Normal Bowel Sounds, Soft, No Tenderness
Vascular: 2+ radial pulses
Other Physical Findings:
Physical exam is limited as patient is non verbal. Pt currently sitting bent 
over in bed. 
 
Assessment/Plan
Assessment:
A: 27-year-old male with a past medical history of Angelman syndrome and bowel 
obstruction and not verbal at baseline who presented with GI bleed.
 
P: 
#GI bleed
H/H 14.6/44.7 -> 14.4/43.2
INR 1.07, PTT 31, PT 11.2
 
-GI ok with discharging patient and outpatient follow up
-monitor h/h
-IV Protonix
-Guaiac stools
 
#Chronic constipation
-Vitamin D, MiraLAX, milk of magnesia
 
#Angelman syndrome
-Continuous observation monitor
 
#full code
 
DVT prophylaxis subcutaneous Lovenox
Problem List:
 1. GI bleed
 
Pain Ratin
Pain Location:
pt nonverbal but did not appear uncomfortable during exam today
Pain Goal: Remain pain free
Pain Plan:
pain pathway
Tomorrow's Labs & Rationales:
cbc
sravanthi Orta MD,Gen 18 1518:
Attending MD Review Statement
 
Attending Statement
Attending MD Statement: examined this patient, reviewed EMR data (avail), 
amended to note
Attending Assessment/Plan:
Mr. Thomas was examined.  He was not interviewed as he is nonverbal.  His EMR 
was reviewed.
He has stable vital signs.  His physical exam is benign.  His CBC has remained 
stable
We'll continue to follow his CBC and his clinical status.  We are continuing his
MiraLAX for constipation.  GI input is appreciated and he will be scheduled for 
an outpatient procedure when she is discharged.

## 2018-01-28 LAB
ABSOLUTE GRANULOCYTE CT: 7.1 /CUMM (ref 1.4–6.5)
BASOPHILS # BLD: 0 /CUMM (ref 0–0.2)
BASOPHILS NFR BLD: 0.4 % (ref 0–2)
EOSINOPHIL # BLD: 0 /CUMM (ref 0–0.7)
EOSINOPHIL NFR BLD: 0.4 % (ref 0–5)
ERYTHROCYTE [DISTWIDTH] IN BLOOD BY AUTOMATED COUNT: 13.9 % (ref 11.5–14.5)
GRANULOCYTES NFR BLD: 73.3 % (ref 42.2–75.2)
HCT VFR BLD CALC: 47.8 % (ref 42–52)
LYMPHOCYTES # BLD: 1.6 /CUMM (ref 1.2–3.4)
MCH RBC QN AUTO: 30.2 PG (ref 27–31)
MCHC RBC AUTO-ENTMCNC: 33.7 G/DL (ref 33–37)
MCV RBC AUTO: 89.6 FL (ref 80–94)
MONOCYTES # BLD: 0.9 /CUMM (ref 0.1–0.6)
PLATELET # BLD: 304 /CUMM (ref 130–400)
PMV BLD AUTO: 9.7 FL (ref 7.4–10.4)
RED BLOOD CELL CT: 5.33 /CUMM (ref 4.7–6.1)
WBC # BLD AUTO: 9.7 /CUMM (ref 4.8–10.8)

## 2018-01-28 NOTE — DISCHARGE SUMMARY
Visit Information
 
Visit Dates
Admission Date:
01/26/18
 
Discharge Date:
1/28/2017
 
 
Hospital Course
 
Course
Attending Physician:
Ferdinand Scott MD
 
Primary Care Physician:
Ferdinand Scott MD
 
Other Care Providers:
Dr. somers
Consulting Request:
   Consulting Specialty: Gastroenterology
Hospital Course:
This is a 27-year-old male with past medical history significant for mental 
retardation, Angelman syndrome, bowel obstruction, constipation presented to the
[place] from his group home for evaluation of bright red blood per rectum.
 
She was admitted to [facility] 2 weeks ago for nausea, vomiting, found 
to have small bowel obstruction, hospitalized for 5 days, managed 
conservatively.
 
The patient was brought to the ED from the group [facility] where he resides.  They 
reported that he had a diaper full of darkish red blood with clots.  Per their 
report he had a large hard stool prior to this.  He had not complained of any 
nausea vomiting or abdominal pain.  Patient is nonverbal.  Review of systems 
unobtainable.
 
 
--------------------------------------------------------------------------------
-----
Vitals at the time of presentation afebrile, heart rate 80, respiratory rate 20,
blood pressure 120/57, saturating at 96 on room air.
Physical exam [id number] normal, breath sounds good, abdomen nondistended 
soft, nontender.
Labs at the time of admission 
hemoglobin 14, hematocrit 44, CBC, BMP completely normal.  LFTs normal.
CAT scan of abdomen and pelvis was done which was unremarkable
 
Assessment and plan
1. Hematochezia
2. Chronic constipation
3: Mental retardation, nonverbal at baseline
 
 
1.  Hematochezia
Patient presented with an episode of bright red blood per rectum with clots.  He
has been constipated for last few days.  Bright red blood was seen after having 
large bowel movements.  He was hemodynamically stable and blood pressure was 
within normal limits.  Not tachycardic.  Hemoglobin 14 and hematocrit 44.7.  
Lower GI bleed hematochezia most likely from acute severe constipation/sterc 
oral ulceration.  Patient denied any further episodes of hematochezia in the 
emergency room.  He was placed in observation status in the general medicine 
floor.
 
Vitals were monitored closely.  Type and screen was done.  2 large IV bore 
needles were placed.  He was given gentle hydration.  Serial monitoring of 
hemoglobin was done.  Hemoglobin and hematocrit remained stable.  He remained 
hemodynamically stable with no more bloody bowel movements.
 
He was seen by gastroenterologist Dr. eL. He was recommended to follow up 
with gastroenterologist as an outpatient and get outpatient colonoscopy.  He was
given IV Protonix.
 
 
 
Chronic constipation
Bowel regimen was added
 
 
Mental retardation
Continuous observation monitor
 
Patient is full code
DVT prophylaxis subcutaneous Lovenox
Regular diet
Allergies:
Coded Allergies:
No Known Allergies (01/26/18)
 
Pertinent Lab Results:
TECHNIQUE:
Contiguous axial thin section helical images of the abdomen and pelvis were
performed following the administration of 95 mL of intravenous Optiray 320.
The data set was reformatted in the coronal and sagittal planes and reviewed
on an independent workstation.
 
DLP: 348 mGy-cm.
 
FINDINGS:
There is mild dependent bibasilar atelectasis. The visualized lung bases are
otherwise clear. The visualized portions of the heart are unremarkable.
 
The liver is of normal size and attenuation without focal lesions nor
intrahepatic biliary ductal dilation. A normal gallbladder is identified.
There is no wall thickening or discernible pericholecystic fluid.
 
The spleen, pancreas, adrenal glands are unremarkable.
 
Both kidneys are of normal size and attenuation without hydronephrosis or
nephrolithiasis. Following the administration of IV contrast, prompt
symmetric nephrograms are displayed.
 
There is no abdominal free fluid. There is neither mesenteric nor
retroperitoneal lymphadenopathy.
 
Normal unopacified loops of small and large bowel are identified. A normal
appendix is identified. There is no pelvic free fluid. The urinary bladder is
unremarkable. There is neither pelvic nor inguinal lymphadenopathy.
 
Bone windows: Neither sclerotic nor lytic bone lesions are identified.
 
IMPRESSION:
 
No evidence for acute abdominal or pelvic inflammatory or infectious
processes.
 
 
Disposition Summary
 
Disposition
Principal Diagnosis:
Lower GI bleed
Additional Diagnosis:
Mental retardation
Discharge Disposition: home or self care
 
Discharge Instructions
 
General Discharge Information
Code Status: Full Code
Patient's Diet:
As tolerated
Patient's Activity:
As tolerated
Follow-Up Instructions/Appts:
Follow-up with PCP in one week after discharge
Please follow-up with gastroenterologist in 1 week after discharge
 
Medications at Discharge
Discharge Medications:
Continue taking these medications:
Ketoconazole (Nizoral) 2 % SHAMPOO
    1 Application On the skin MONDAY, WEDNESDAY AND FRIDAY
    Comments:
       NOT GIVEN
 
Mineral Oil/Hydrophil Petrolat (Aquaphor Ointment) 396 GM OINT...G.
    1 Application On the skin As Directed as needed for SKIN
    Comments:
       NOT GIVEN
 
Pyrithione Zinc (Selsun Blue) (Unknown Strength) SHAMPOO
    Unknown Dose On the skin As Directed as needed for DANDRUFF
    Comments:
       NOT GIVEN
 
Petrolatum,White (Balmex) 51.1 % OINT...G.
    1 Application On the skin As Directed as needed for PERINEAL AREA/RED, 
CHAFFED SKI
    Comments:
       NOT GIVEN
 
Magnesium Hydroxide (Milk Of Magnesia) 400 MG/5 ML ORAL.SUSP
    15 Milliliters ORAL Every night as needed for CONSTIPATION
    Comments:
       NOT GIVEN
 
Acetaminophen (Acetaminophen) 325 MG TABLET
    2 Tablet ORAL Q4H as needed for PAIN/FEVER>100.2
    Comments:
       NOT GIVEN
 
Guaifenesin/Dextromethorphan (Zyncof  MG/5 Ml Liquid) 400 MG-20 MG/5 ML 
LIQUID
    5 Milliliters ORAL Q4H as needed for COUGH/CONGESTION
    Comments:
       NOT GIVEN
 
Bacitracin (Bacitracin) 500 UNIT/GRAM OINT...G.
    1 Application On the skin TWICE DAILY as needed for ABRASIONS/SCRAPES/MINOR 
YUEN
    Instructions:
       apply to affected area(s)
    Comments:
       NOT GIVEN
 
Chlorhexidine Gluconate (Periogard) (Unknown Strength) MOUTHWASH
    Unknown Dose ORAL TWICE DAILY
    Comments:
       NOT GIVEN
 
Cholecalciferol (Vitamin D3) (Vitamin D) 1,000 UNIT TABLET
    1 Tablet ORAL DAILY
    Comments:
       Last Taken: 1/28/18
             Time: 0900
 
Polyethylene Glycol 3350 (Miralax) 17 GRAM POWD.PACK
    1 Packet ORAL DAILY
    Instructions:
       dissolve in water
    Comments:
       NOT GIVEN
 
Start taking the following new medications:
Omeprazole (Omeprazole) 20 MG TABLET.DR
    1 Tablet ORAL DAILY
    Qty = 30
    No Refills
 
 
Copies To:
Sonia HAMPTON,Ferdinand

## 2018-01-28 NOTE — PN- HOUSESTAFF
Subjective
Follow-up For:
lower gi bleed
Subjective:
he was seen and examined
No acute events
No more bloody bowel movements.  
No abdomen pain, nausea, vomiting, hematemesis, melena, hematochezia
Hemoglobin remained stable
Vitals stable
 
 
Review of Systems
Constitutional:
Reports: see HPI. 
 
Objective
Last 24 Hrs of Vital Signs/I&O
 Vital Signs
 
 
Date Time Temp Pulse Resp B/P B/P Pulse O2 O2 Flow FiO2
 
     Mean Ox Delivery Rate 
 
 0619 97.3 78 20   96   
 
 2132 98.3 64 18 100/60  100 Room Air  
 
 1440 98.0 70 18 120/70  98 Room Air  
 
 
 Intake & Output
 
 
  1600  0800  0000
 
Intake Total  250 350
 
Output Total   
 
Balance  250 350
 
    
 
Intake, Oral  250 350
 
 
 
 
Physical Exam
General Appearance: Alert, Oriented X3, Cooperative, No Acute Distress
Other Physical Findings:
Cardiovascular Regular Rate, Normal S1, Normal S2
Lungs Clear to Auscultation, Normal Air Movement
Abdomen Normal Bowel Sounds, Soft, No Tenderness
Vascular 2+ radial pulses
Current Medications:
 Current Medications
 
 
  Sig/Cristian Start time  Last
 
Medication Dose Route Stop Time Status Admin
 
Acetaminophen 650 MG Q6P PRN 2000 AC 
 
  PO   
 
Cholecalciferol 1,000 IU DAILY  1000 AC 
 
  PO   0902
 
Enoxaparin Sodium 40 MG DAILY  1000 AC 
 
  SC   1118
 
Guaifenesin/Codeine  10 ML Q6P PRN 2000 AC 
 
Phosphate  PO   
 
Magnesium Hydroxide 15 ML QPM PRN 2000 AC 
 
  PO   
 
Pantoprazole Sodium 40 MG DAILY  195 AC 
 
  IV   0902
 
Polyethylene Glycol 17 GM DAILY PRN 2000 AC 
 
  PO   
 
 
 
 
Assessment/Plan
Assessment:
This is a 27-year-old male with past medical history significant for mental 
retardation, Angelman syndrome, bowel obstruction, constipation presented to the
[place] from his group home for evaluation of bright red blood per rectum.
he was admitted to [facility] 2 weeks ago for nausea, vomiting, found to
have small bowel obstruction, hospitalized for 5 days, managed conservatively.
 
 
 
 
--------------------------------------------------------------------------------
-----
Vitals at the time of presentation afebrile, heart rate 80, respiratory rate 20,
blood pressure 120/57, saturating at 96 on room air.
Physical exam [id number] normal, breath sounds good, abdomen nondistended 
soft, nontender.
Labs at the time of admission 
hemoglobin 14, hematocrit 44, CBC, BMP completely normal.  LFTs normal.
CAT scan of abdomen and pelvis was done which was unremarkable
 
Assessment and plan
1. Hematochezia
2. Chronic constipation
3: Mental retardation, nonverbal at baseline
 
 
1.  Hematochezia
Patient presented with an episode of bright red blood per rectum with clots.  He
has been constipated for last few days.  Bright red blood was seen after having 
large bowel movements.  He is hemodynamically stable blood pressure within 
normal limits.  Not tachycardic.  Hemoglobin 14 and hematocrit 44.7.  Lower GI 
bleed hematochezia most likely from acute severe constipation/sterc oral 
ulceration.  Patient denies any further episodes of hematochezia in the 
emergency room.
 
* Placed under observation status in the general medicine floor
* Monitor vitals closely every shift
* Type and screen done
* 2 large IV bore needles 
* gentle hydration
* Serial monitoring of hemoglobin and hematocrit- stable
* Closely monitored for any signs of GI bleed
* IV Protonix
* Management of constipation with bowel regimen
* Appreciate Gastro recommendation
* orthostats- neg
* Guaiac stools
* Patient will need outpatient colonoscopy.
 
 
Chronic constipation
Bowel regimen was added
 
Mental retardation
Continuous observation monitor
 
Patient is full code
DVT prophylaxis subcutaneous Lovenox
Regular diet
Problem List:
 1. GI bleed
 
Pain Ratin
Pain Location:
n/a
Pain Goal: Remain pain free
Pain Plan:
tylinol
Tomorrow's Labs & Rationales:
n/a